# Patient Record
Sex: FEMALE | Race: WHITE | NOT HISPANIC OR LATINO | URBAN - METROPOLITAN AREA
[De-identification: names, ages, dates, MRNs, and addresses within clinical notes are randomized per-mention and may not be internally consistent; named-entity substitution may affect disease eponyms.]

---

## 2017-01-06 DIAGNOSIS — D09.9 ADENOCARCINOMA IN SITU: Primary | ICD-10-CM

## 2017-01-06 RX ORDER — PHENAZOPYRIDINE HYDROCHLORIDE 200 MG/1
200 TABLET, FILM COATED ORAL ONCE
Status: CANCELLED | OUTPATIENT
Start: 2017-01-06 | End: 2017-01-06

## 2017-01-12 ENCOUNTER — ANESTHESIA EVENT (OUTPATIENT)
Dept: SURGERY | Facility: AMBULATORY SURGERY CENTER | Age: 37
End: 2017-01-12

## 2017-01-12 ENCOUNTER — ANESTHESIA (OUTPATIENT)
Dept: SURGERY | Facility: AMBULATORY SURGERY CENTER | Age: 37
End: 2017-01-12

## 2017-01-12 ENCOUNTER — HOSPITAL ENCOUNTER (OUTPATIENT)
Facility: AMBULATORY SURGERY CENTER | Age: 37
End: 2017-01-12
Attending: OBSTETRICS & GYNECOLOGY

## 2017-01-12 VITALS
TEMPERATURE: 98.2 F | RESPIRATION RATE: 20 BRPM | OXYGEN SATURATION: 98 % | HEIGHT: 63 IN | WEIGHT: 133 LBS | BODY MASS INDEX: 23.57 KG/M2 | SYSTOLIC BLOOD PRESSURE: 163 MMHG | DIASTOLIC BLOOD PRESSURE: 104 MMHG

## 2017-01-12 DIAGNOSIS — D09.9 ADENOCARCINOMA IN SITU: Primary | ICD-10-CM

## 2017-01-12 DIAGNOSIS — D06.9 CIN III (CERVICAL INTRAEPITHELIAL NEOPLASIA GRADE III) WITH SEVERE DYSPLASIA: ICD-10-CM

## 2017-01-12 DIAGNOSIS — D06.9 ADENOCARCINOMA IN SITU (AIS) OF UTERINE CERVIX: ICD-10-CM

## 2017-01-12 LAB
HCG UR QL: NORMAL
INTERNAL QC OK POCT: YES

## 2017-01-12 RX ORDER — PHENAZOPYRIDINE HYDROCHLORIDE 100 MG/1
200 TABLET, FILM COATED ORAL ONCE
Status: DISCONTINUED | OUTPATIENT
Start: 2017-01-12 | End: 2017-01-12 | Stop reason: HOSPADM

## 2017-01-12 RX ORDER — HYDROCODONE BITARTRATE AND ACETAMINOPHEN 5; 325 MG/1; MG/1
1-2 TABLET ORAL
Status: DISCONTINUED | OUTPATIENT
Start: 2017-01-12 | End: 2017-01-13 | Stop reason: HOSPADM

## 2017-01-12 RX ORDER — KETAMINE HYDROCHLORIDE 10 MG/ML
INJECTION, SOLUTION INTRAMUSCULAR; INTRAVENOUS PRN
Status: DISCONTINUED | OUTPATIENT
Start: 2017-01-12 | End: 2017-01-12

## 2017-01-12 RX ORDER — SODIUM CHLORIDE, SODIUM LACTATE, POTASSIUM CHLORIDE, CALCIUM CHLORIDE 600; 310; 30; 20 MG/100ML; MG/100ML; MG/100ML; MG/100ML
INJECTION, SOLUTION INTRAVENOUS CONTINUOUS
Status: DISCONTINUED | OUTPATIENT
Start: 2017-01-12 | End: 2017-01-12 | Stop reason: HOSPADM

## 2017-01-12 RX ORDER — ACETAMINOPHEN 500 MG
1000 TABLET ORAL ONCE
Status: COMPLETED | OUTPATIENT
Start: 2017-01-12 | End: 2017-01-12

## 2017-01-12 RX ORDER — MAGNESIUM HYDROXIDE 1200 MG/15ML
LIQUID ORAL PRN
Status: DISCONTINUED | OUTPATIENT
Start: 2017-01-12 | End: 2017-01-12 | Stop reason: HOSPADM

## 2017-01-12 RX ORDER — MEPERIDINE HYDROCHLORIDE 25 MG/ML
12.5 INJECTION INTRAMUSCULAR; INTRAVENOUS; SUBCUTANEOUS
Status: DISCONTINUED | OUTPATIENT
Start: 2017-01-12 | End: 2017-01-13 | Stop reason: HOSPADM

## 2017-01-12 RX ORDER — BUPIVACAINE HYDROCHLORIDE 5 MG/ML
INJECTION, SOLUTION EPIDURAL; INTRACAUDAL PRN
Status: DISCONTINUED | OUTPATIENT
Start: 2017-01-12 | End: 2017-01-12 | Stop reason: HOSPADM

## 2017-01-12 RX ORDER — PROPOFOL 10 MG/ML
INJECTION, EMULSION INTRAVENOUS CONTINUOUS PRN
Status: DISCONTINUED | OUTPATIENT
Start: 2017-01-12 | End: 2017-01-12

## 2017-01-12 RX ORDER — FENTANYL CITRATE 50 UG/ML
25-50 INJECTION, SOLUTION INTRAMUSCULAR; INTRAVENOUS
Status: DISCONTINUED | OUTPATIENT
Start: 2017-01-12 | End: 2017-01-13 | Stop reason: HOSPADM

## 2017-01-12 RX ORDER — ONDANSETRON 4 MG/1
4 TABLET, ORALLY DISINTEGRATING ORAL EVERY 30 MIN PRN
Status: DISCONTINUED | OUTPATIENT
Start: 2017-01-12 | End: 2017-01-13 | Stop reason: HOSPADM

## 2017-01-12 RX ORDER — LIDOCAINE HYDROCHLORIDE 20 MG/ML
INJECTION, SOLUTION INFILTRATION; PERINEURAL PRN
Status: DISCONTINUED | OUTPATIENT
Start: 2017-01-12 | End: 2017-01-12

## 2017-01-12 RX ORDER — IODINE SOLUTION STRONG 5% (LUGOL'S) 5 %
SOLUTION ORAL PRN
Status: DISCONTINUED | OUTPATIENT
Start: 2017-01-12 | End: 2017-01-12 | Stop reason: HOSPADM

## 2017-01-12 RX ORDER — DEXAMETHASONE SODIUM PHOSPHATE 4 MG/ML
INJECTION, SOLUTION INTRA-ARTICULAR; INTRALESIONAL; INTRAMUSCULAR; INTRAVENOUS; SOFT TISSUE PRN
Status: DISCONTINUED | OUTPATIENT
Start: 2017-01-12 | End: 2017-01-12

## 2017-01-12 RX ORDER — PROPOFOL 10 MG/ML
INJECTION, EMULSION INTRAVENOUS PRN
Status: DISCONTINUED | OUTPATIENT
Start: 2017-01-12 | End: 2017-01-12

## 2017-01-12 RX ORDER — HYDROCODONE BITARTRATE AND ACETAMINOPHEN 5; 325 MG/1; MG/1
1 TABLET ORAL EVERY 6 HOURS PRN
Status: ON HOLD | COMMUNITY
End: 2017-01-27

## 2017-01-12 RX ORDER — SODIUM CHLORIDE, SODIUM LACTATE, POTASSIUM CHLORIDE, CALCIUM CHLORIDE 600; 310; 30; 20 MG/100ML; MG/100ML; MG/100ML; MG/100ML
INJECTION, SOLUTION INTRAVENOUS CONTINUOUS
Status: DISCONTINUED | OUTPATIENT
Start: 2017-01-12 | End: 2017-01-13 | Stop reason: HOSPADM

## 2017-01-12 RX ORDER — NALOXONE HYDROCHLORIDE 0.4 MG/ML
.1-.4 INJECTION, SOLUTION INTRAMUSCULAR; INTRAVENOUS; SUBCUTANEOUS
Status: DISCONTINUED | OUTPATIENT
Start: 2017-01-12 | End: 2017-01-13 | Stop reason: HOSPADM

## 2017-01-12 RX ORDER — IBUPROFEN 200 MG
600 TABLET ORAL
Status: DISCONTINUED | OUTPATIENT
Start: 2017-01-12 | End: 2017-01-13 | Stop reason: HOSPADM

## 2017-01-12 RX ORDER — ONDANSETRON 2 MG/ML
INJECTION INTRAMUSCULAR; INTRAVENOUS PRN
Status: DISCONTINUED | OUTPATIENT
Start: 2017-01-12 | End: 2017-01-12

## 2017-01-12 RX ORDER — FENTANYL CITRATE 50 UG/ML
25-50 INJECTION, SOLUTION INTRAMUSCULAR; INTRAVENOUS
Status: DISCONTINUED | OUTPATIENT
Start: 2017-01-12 | End: 2017-01-12 | Stop reason: HOSPADM

## 2017-01-12 RX ORDER — ONDANSETRON 2 MG/ML
4 INJECTION INTRAMUSCULAR; INTRAVENOUS EVERY 30 MIN PRN
Status: DISCONTINUED | OUTPATIENT
Start: 2017-01-12 | End: 2017-01-13 | Stop reason: HOSPADM

## 2017-01-12 RX ADMIN — Medication 1000 MG: at 08:00

## 2017-01-12 RX ADMIN — KETAMINE HYDROCHLORIDE 15 MG: 10 INJECTION, SOLUTION INTRAMUSCULAR; INTRAVENOUS at 08:37

## 2017-01-12 RX ADMIN — PROPOFOL 40 MG: 10 INJECTION, EMULSION INTRAVENOUS at 08:45

## 2017-01-12 RX ADMIN — PROPOFOL 50 MG: 10 INJECTION, EMULSION INTRAVENOUS at 08:50

## 2017-01-12 RX ADMIN — PROPOFOL 60 MG: 10 INJECTION, EMULSION INTRAVENOUS at 08:34

## 2017-01-12 RX ADMIN — PROPOFOL 200 MCG/KG/MIN: 10 INJECTION, EMULSION INTRAVENOUS at 08:34

## 2017-01-12 RX ADMIN — PROPOFOL 100 MG: 10 INJECTION, EMULSION INTRAVENOUS at 08:56

## 2017-01-12 RX ADMIN — SODIUM CHLORIDE, SODIUM LACTATE, POTASSIUM CHLORIDE, CALCIUM CHLORIDE: 600; 310; 30; 20 INJECTION, SOLUTION INTRAVENOUS at 08:00

## 2017-01-12 RX ADMIN — KETAMINE HYDROCHLORIDE 20 MG: 10 INJECTION, SOLUTION INTRAMUSCULAR; INTRAVENOUS at 08:48

## 2017-01-12 RX ADMIN — PROPOFOL 50 MG: 10 INJECTION, EMULSION INTRAVENOUS at 08:53

## 2017-01-12 RX ADMIN — ONDANSETRON 4 MG: 2 INJECTION INTRAMUSCULAR; INTRAVENOUS at 08:32

## 2017-01-12 RX ADMIN — LIDOCAINE HYDROCHLORIDE 60 MG: 20 INJECTION, SOLUTION INFILTRATION; PERINEURAL at 08:34

## 2017-01-12 RX ADMIN — DEXAMETHASONE SODIUM PHOSPHATE 4 MG: 4 INJECTION, SOLUTION INTRA-ARTICULAR; INTRALESIONAL; INTRAMUSCULAR; INTRAVENOUS; SOFT TISSUE at 08:32

## 2017-01-12 ASSESSMENT — LIFESTYLE VARIABLES: TOBACCO_USE: 1

## 2017-01-12 NOTE — BRIEF OP NOTE
[facility]    Brief Operative Note    Pre-operative diagnosis: DAYAN III, Adenocarcinoma in Situ  Post-operative diagnosis Same, s/p procedure below  Procedure: Procedure(s):  Exam Under Anesthesia, Loop Electrosurgical Excision Procedure (LEEP) - Wound Class: II-Clean Contaminated     Surgeon(s) and Role:  Alex Rose MD - Primary  Keila Louis MD PGY1- Assistant     Anesthesia: Monitor Anesthesia Care converted to LMA   Estimated blood loss: Less than 15 ml  UOP: Voided prior to OR  IVF: 350mL     Specimens: Anterior and posterior ectocervix   Findings:  Normal external genitalia, pink vaginal mucosa, uterus anteverted, cervix parous with rough surface. Previously biopsied sites visualized. Closed cervical os.  .    Complications: None apparent.    Keila Louis  01/12/2017  9:16 AM

## 2017-01-12 NOTE — OP NOTE
Operative Note    Pre-operative diagnosis:        DAYAN III, Adenocarcinoma in Situ  Post-operative diagnosis        Same, s/p procedure below  Procedure:      Procedure(s):  Exam Under Anesthesia Colposcopy, Loop Electrosurgical Excision Procedure (LEEP) - Wound Class: II-Clean Contaminated     Surgeon(s) and Role:  Alex Rose MD - Primary  Keila Louis MD PGY1- Assistant     Anesthesia:     Monitor Anesthesia Care          Estimated blood loss:  UOP: Voided prior OR   IVF: 350mL LR    Specimens: Anterior and posterior ectocervix   Findings: Normal external genitalia, pink vaginal mucosa, uterus anteverted, cervix parous with rough surface. Previously biopsied sites visualized. Closed cervical os.       Indications: Kellee Haile is a 36 year old  who has a history of normal pap, + HPV in 2012 and 2014 followed by LSIL and +HPV in 2016. A subsequent colposcopy showed high grade squamous dysplasia DAYAN III identified with koilocytotic atypia consistent with HPV infection, and adenocarcinoma-in situ. Discussed diagnostic and therapeutic management with LEEP cone biopsy followed by eventual hysterectomy. Patient agreed to LEEP cone biopsy. Risks, benefits of treatment, and no treatment were discussed. Patient's questions were elicited and answered.  and she consented to the procedure.     Procedure: Exam Under Anesthesia, Loop Electrosurgical Excision Procedure (LEEP)   Settings:   Cut 75      Coag 75       Blend 60/40       LOOP size/type: 90x45ic    Anesthesia: Monitor Anesthesia Care converted to LMA , local bupivicaine.   Prep: Lugol's solution    Description:  The patient was taken to the operating room where she initially underwent MAC anesthesia however had multiple episodes of coughing and underwent general anesthesia with LMA without difficulty. She was placed in the dorsal lithotomy position. She was prepped and draped in the usual sterile fashion. A timeout was performed. A sterile speculum was  inserted into the vagina. The vaginal canal and cervix was rinsed. A intracervical block was performed by injecting a total of 10cc bupivicaine at the 9 o'clock position into the length of the cervix. This was repeated at the 3 o'clock position.   The loop electro cautery device was passed on the anterior and posterior aspects of the cervix, and specimen was collected. Bleeding was controlled with pressure, ball-tip cautery, and Monsel's solution. There was minimal bleeding noted and the tenaculum removed with good hemostasis noted. The patient tolerated the procedure well and was taken to the recovery area in stable condition.     Complications: None apparent.    Keila Louis  01/12/2017  9:27 AM

## 2017-01-12 NOTE — IP AVS SNAPSHOT
MRN:[id number]                      After Visit Summary   1/12/2017    Kellee Haile    MRN: [id number]           Thank you!     Thank you for choosing [facility] for your care. Our goal is always to provide you with excellent care. Hearing back from our patients is one way we can continue to improve our services. Please take a few minutes to complete the written survey that you may receive in the mail after you visit with us. Thank you!        Patient Information     Date Of Birth          1980        About your hospital stay     You were admitted on:  January 12, 2017 [facility]    You were discharged on:  January 12, 2017       Who to Call     For medical emergencies, please call 911.  For non-urgent questions about your medical care, please call your primary care provider or clinic, [phone]  For questions related to your surgery, please call your surgery clinic        Attending Provider     Provider    Alex Rose MD       Primary Care Provider Office Phone # Fax #    Surinder Collins [phone]       [facility] [address] [address]        After Care Instructions     Discharge Instructions       Resume pre procedure diet            Discharge Instructions       Pelvic Rest. No tampons, douching or intercourse for  4  weeks.            Discharge Instructions       Patient may return to work POD  1            Discharge Instructions       Patient may drive beginning POD  1            Discharge Instructions       Follow-up at your appointment on 2/22/17            Discharge Instructions       Please call your provider if you have fevers greater than 100.4, foul smelling vaginal discharge, or you have vaginal bleeding that is soaking through greater than 1 pad per hour.            No Aspirin, Ibuprofen or Naproxen       Take 600 mg over-the-counter Ibuprofen by mouth every 6 hours as needed for  pain            No alcohol       NO ALCOHOL for 24 hours post procedure            No driving or operating machinery       No driving or operating machinery until day after procedure            Shower        Shower on Post-op day  1.   DO NOT take a bath                  Your next 10 appointments already scheduled     Jan 27, 2017   Procedure with Alex Rose MD   [facility], [place], Same Day Surgery (--)    [address] [facility] [address]   [phone]            Feb 22, 2017  9:30 AM   (Arrive by 9:15 AM)   Post-Op with Alex Rose MD   [facility] ([facility])    [address]  2nd Floor  [facility] [id number] [phone]              Further instructions from your care team       [facility]  Home Care Following Anesthesia  For 24 hours after surgery:  1. Get plenty of rest.  A responsible adult must stay with you for at least 24 hours after you leave the surgery center.  2. Do not drive or use heavy equipment.  If you have weakness or tingling, don't drive or use heavy equipment until this feeling goes away.   3. Do not drink alcohol.   4. Avoid strenuous or risky activities.  Ask for help when climbing stairs.  5. You may feel lightheaded.  IF so, sit for a few minutes before standing.  Have someone help you get up.   6. If you have nausea (feel sick to your stomach): Drink only clear liquids such as apple juice, ginger ale, broth or 7-Up.  Rest may also help.  Be sure to drink enough fluids.  Move to a regular diet as you feel able.   7. You may have a slight fever.  Call the doctor if your fever is over 100 F (37.7 C) (taken under the tongue) or lasts longer than 24 hours.  8. You may have a dry mouth, a sore throat, muscle aches or trouble sleeping. These should go away after 24 hours.  9. Do not make important or legal decisions.   If you are female and have had general anesthesia you may have received a  "medication that inhibits the effectiveness of oral contraceptives.  We suggest you use a backup method of contraception for the next 7 days if this applies to you.  Tips for taking pain medications  To get the best pain relief possible, remember these points:    Take pain medications as directed, before pain becomes severe.    Pain medication can upset your stomach: taking it with food may help.    Constipation is a common side effect of pain medication. Drink plenty of  fluids.    Eat foods high in fiber. Take a stool softener if recommended by your doctor or pharmacist.    Do not drink alcohol, drive or operate machinery while taking pain medications.    Ask about other ways to control pain, such as with heat, ice or relaxation.    Call a doctor for any of the followin. Signs of infection (fever, growing tenderness at the surgery site, a large amount of drainage or bleeding, severe pain, foul-smelling drainage, redness, swelling).  2. It has been over 8 to 10 hours since surgery and you are still not able to urinate (pass water).  3. Headache for over 24 hours.      Your doctor is:  Dr. Alex Rose, Gynecologic Oncology: [phone]                                    Or dial [phone] and ask for the resident on call for:  Gynecologic Oncology  For emergency care, call the:  [facility]:  [phone] (TTY for hearing impaired: [phone])                      Pending Results     No orders found from 2017 to 2017.            Admission Information        Provider Department Dept Phone    2017 Alex Rose MD  Main [place] [phone]      Your Vitals Were     Blood Pressure Temperature Height    141/94 mmHg 98.7  F (37.1  C) (Oral) 1.6 m (5' 3\")    Weight BMI (Body Mass Index) Pulse Oximetry    60.328 kg (133 lb) 23.57 kg/m2 98%      MyChart Information     [employer] gives you secure access to your electronic health record. If you see a primary care provider, you can also " send messages to your care team and make appointments. If you have questions, please call your primary care clinic.  If you do not have a primary care provider, please call [phone] and they will assist you.      [employer] is an electronic gateway that provides easy, online access to your medical records. With [employer], you can request a clinic appointment, read your test results, renew a prescription or communicate with your care team.     To access your existing account, please contact your [facility] or call [phone] for assistance.        Care EveryWhere ID     This is your Care EveryWhere ID. This could be used by other organizations to access your [facility] medical records  [id number]           Review of your medicines      CONTINUE these medicines which have NOT CHANGED        Dose / Directions    HYDROcodone-acetaminophen 5-325 MG per tablet   Commonly known as:  NORCO        Dose:  1 tablet   Take 1 tablet by mouth every 6 hours as needed for moderate to severe pain   Refills:  0       norgestrel-ethinyl estradiol 0.3-30 MG-MCG per tablet   Commonly known as:  LO/OVRAL        Dose:  1 tablet   Take 1 tablet by mouth daily   Refills:  0                Protect others around you: Learn how to safely use, store and throw away your medicines at www.disposemymeds.org.             Medication List: This is a list of all your medications and when to take them. Check marks below indicate your daily home schedule. Keep this list as a reference.      Medications           Morning Afternoon Evening Bedtime As Needed    HYDROcodone-acetaminophen 5-325 MG per tablet   Commonly known as:  NORCO   Take 1 tablet by mouth every 6 hours as needed for moderate to severe pain                                norgestrel-ethinyl estradiol 0.3-30 MG-MCG per tablet   Commonly known as:  LO/OVRAL   Take 1 tablet by mouth daily

## 2017-01-12 NOTE — ANESTHESIA CARE TRANSFER NOTE
Patient: Kellee Haile    COMBINED COLPOSCOPY CERVIX, LOOP ELECTRODE BIOPSY (N/A Cervix)  Additional InformationProcedure(s):  Exam Under Anesthesia, Loop Electrosurgical Excision Procedure (LEEP) - Wound Class: II-Clean Contaminated    Diagnosis: Adenocarcinoma in Situ  Diagnosis Additional Information: No value filed.    Anesthesia Type:   MAC     Note:  Airway :Nasal Cannula  Patient transferred to:PACU  Comments: Patient to PACU on 2L O2 via NC. Patient is awake and verbal. Report to RN and transfer of care. BP:131/100  HR: 99 SpO2: 99% on 2L O2. RR: 18 Temp: 36.5      Vitals: (Last set prior to Anesthesia Care Transfer)              Electronically Signed By: ANGELIQUE Padron CRNA  January 12, 2017  9:21 AM

## 2017-01-12 NOTE — OR NURSING
"Patient given instructions for discharge with Brother. Additional instruction given on the importance of maintaining healthy blood pressure control. This RN highly encourages patient to seek attention to get blood pressure regulated before surgery in two weeks, risks of uncontrolled blood pressure reviewed with patient, additional risks also discussed like smoking history for increased risk of CVA. Patient states that blood pressure has been elevated \"for a few months now\"  "

## 2017-01-12 NOTE — ANESTHESIA POSTPROCEDURE EVALUATION
Patient: Kellee Haile    COMBINED COLPOSCOPY CERVIX, LOOP ELECTRODE BIOPSY (N/A Cervix)  Additional InformationProcedure(s):  Exam Under Anesthesia, Loop Electrosurgical Excision Procedure (LEEP) - Wound Class: II-Clean Contaminated    Diagnosis:Adenocarcinoma in Situ  Diagnosis Additional Information: No value filed.    Anesthesia Type:  MAC    Note:  Anesthesia Post Evaluation    Patient location during evaluation: PACU and Bedside  Patient participation: Able to fully participate in evaluation  Level of consciousness: awake and alert  Pain management: adequate  Airway patency: patent  Cardiovascular status: acceptable  Respiratory status: acceptable  Hydration status: acceptable  PONV: none     Anesthetic complications: None          Last vitals:  Filed Vitals:    01/12/17 0936 01/12/17 0940 01/12/17 1005   BP: 153/97 152/101 163/104   Temp: 36.8  C (98.2  F) 36.8  C (98.2  F) 36.8  C (98.2  F)   Resp:  18 20   SpO2:  100% 98%       Electronically Signed By: Melania Smyth MD  January 12, 2017  10:40 AM

## 2017-01-12 NOTE — ANESTHESIA PREPROCEDURE EVALUATION
Anesthesia Evaluation     . Pt has had prior anesthetic. Type: General      ROS/MED HX    ENT/Pulmonary:     (+)tobacco use, Current use 1ppd x 25 yrs packs/day  , . .    Neurologic:  - neg neurologic ROS     Cardiovascular:  - neg cardiovascular ROS       METS/Exercise Tolerance:  >4 METS   Hematologic:  - neg hematologic  ROS       Musculoskeletal:  - neg musculoskeletal ROS       GI/Hepatic:  - neg GI/hepatic ROS       Renal/Genitourinary:     (+) Other Renal/ Genitourinary, Cervix/abnormal PAP smear, endometriosis      Endo:  - neg endo ROS       Psychiatric:  - neg psychiatric ROS       Infectious Disease:  - neg infectious disease ROS       Malignancy:      - no malignancy   Other:    (+) No chance of pregnancy   - neg other ROS           Physical Exam  Normal systems: cardiovascular, pulmonary and dental    Airway   Mallampati: I  TM distance: >3 FB  Neck ROM: full    Dental     Cardiovascular   Rhythm and rate: regular and normal      Pulmonary    breath sounds clear to auscultation                    Anesthesia Plan      History & Physical Review  History and physical reviewed and following examination; no interval change.    ASA Status:  1 .    NPO Status:  > 8 hours    Plan for MAC with Propofol induction. Maintenance will be Other.  Reason for MAC:  Deep or markedly invasive procedure (G8)  PONV prophylaxis:  Ondansetron (or other 5HT-3)       Postoperative Care  Postoperative pain management:  IV analgesics and Oral pain medications.      Consents  Anesthetic plan, risks, benefits and alternatives discussed with:  Patient..                          .

## 2017-01-12 NOTE — IP AVS SNAPSHOT
[facility]    [address] [place] [id number]    Phone:  [phone]    Fax:  [phone]                                       After Visit Summary   1/12/2017    Kellee Haile    MRN: [id number]           After Visit Summary Signature Page     I have received my discharge instructions, and my questions have been answered. I have discussed any challenges I see with this plan with the nurse or doctor.    ..........................................................................................................................................  Patient/Patient Representative Signature      ..........................................................................................................................................  Patient Representative Print Name and Relationship to Patient    ..................................................               ................................................  Date                                            Time    ..........................................................................................................................................  Reviewed by Signature/Title    ...................................................              ..............................................  Date                                                            Time

## 2017-01-12 NOTE — DISCHARGE INSTRUCTIONS
[facility]  Home Care Following Anesthesia  For 24 hours after surgery:  1. Get plenty of rest.  A responsible adult must stay with you for at least 24 hours after you leave the surgery center.  2. Do not drive or use heavy equipment.  If you have weakness or tingling, don't drive or use heavy equipment until this feeling goes away.   3. Do not drink alcohol.   4. Avoid strenuous or risky activities.  Ask for help when climbing stairs.  5. You may feel lightheaded.  IF so, sit for a few minutes before standing.  Have someone help you get up.   6. If you have nausea (feel sick to your stomach): Drink only clear liquids such as apple juice, ginger ale, broth or 7-Up.  Rest may also help.  Be sure to drink enough fluids.  Move to a regular diet as you feel able.   7. You may have a slight fever.  Call the doctor if your fever is over 100 F (37.7 C) (taken under the tongue) or lasts longer than 24 hours.  8. You may have a dry mouth, a sore throat, muscle aches or trouble sleeping. These should go away after 24 hours.  9. Do not make important or legal decisions.   If you are female and have had general anesthesia you may have received a medication that inhibits the effectiveness of oral contraceptives.  We suggest you use a backup method of contraception for the next 7 days if this applies to you.  Tips for taking pain medications  To get the best pain relief possible, remember these points:    Take pain medications as directed, before pain becomes severe.    Pain medication can upset your stomach: taking it with food may help.    Constipation is a common side effect of pain medication. Drink plenty of  fluids.    Eat foods high in fiber. Take a stool softener if recommended by your doctor or pharmacist.    Do not drink alcohol, drive or operate machinery while taking pain medications.    Ask about other ways to control pain, such as with heat, ice or relaxation.    Call a doctor  for any of the followin. Signs of infection (fever, growing tenderness at the surgery site, a large amount of drainage or bleeding, severe pain, foul-smelling drainage, redness, swelling).  2. It has been over 8 to 10 hours since surgery and you are still not able to urinate (pass water).  3. Headache for over 24 hours.      Your doctor is:  Dr. Alex Rose, Gynecologic Oncology: [phone]                                    Or dial [phone] and ask for the resident on call for:  Gynecologic Oncology  For emergency care, call the:  [facility]:  [phone] (TTY for hearing impaired: [phone])

## 2017-01-13 LAB — COPATH REPORT: NORMAL

## 2017-01-16 ENCOUNTER — TRANSFERRED RECORDS (OUTPATIENT)
Dept: HEALTH INFORMATION MANAGEMENT | Facility: CLINIC | Age: 37
End: 2017-01-16

## 2017-01-27 ENCOUNTER — ANESTHESIA (OUTPATIENT)
Dept: SURGERY | Facility: CLINIC | Age: 37
End: 2017-01-27
Payer: COMMERCIAL

## 2017-01-27 ENCOUNTER — SURGERY (OUTPATIENT)
Age: 37
End: 2017-01-27
Payer: COMMERCIAL

## 2017-01-27 PROCEDURE — 25000308 HC RX OP HPI UCR WEL MED 250 IP 250: Performed by: STUDENT IN AN ORGANIZED HEALTH CARE EDUCATION/TRAINING PROGRAM

## 2017-01-27 PROCEDURE — 25000125 ZZHC RX 250: Performed by: OBSTETRICS & GYNECOLOGY

## 2017-01-27 PROCEDURE — 25000125 ZZHC RX 250: Performed by: ANESTHESIOLOGY

## 2017-01-27 PROCEDURE — C9290 INJ, BUPIVACAINE LIPOSOME: HCPCS | Performed by: ANESTHESIOLOGY

## 2017-01-27 PROCEDURE — 25000125 ZZHC RX 250: Performed by: STUDENT IN AN ORGANIZED HEALTH CARE EDUCATION/TRAINING PROGRAM

## 2017-01-27 PROCEDURE — 58571 TLH W/T/O 250 G OR LESS: CPT | Mod: GC | Performed by: OBSTETRICS & GYNECOLOGY

## 2017-01-27 PROCEDURE — 25000128 H RX IP 250 OP 636: Performed by: ANESTHESIOLOGY

## 2017-01-27 PROCEDURE — 25000128 H RX IP 250 OP 636: Performed by: STUDENT IN AN ORGANIZED HEALTH CARE EDUCATION/TRAINING PROGRAM

## 2017-01-27 PROCEDURE — 25800025 ZZH RX 258: Performed by: STUDENT IN AN ORGANIZED HEALTH CARE EDUCATION/TRAINING PROGRAM

## 2017-01-27 RX ORDER — GLYCOPYRROLATE 0.2 MG/ML
INJECTION, SOLUTION INTRAMUSCULAR; INTRAVENOUS PRN
Status: DISCONTINUED | OUTPATIENT
Start: 2017-01-27 | End: 2017-01-27

## 2017-01-27 RX ORDER — DEXAMETHASONE SODIUM PHOSPHATE 4 MG/ML
INJECTION, SOLUTION INTRA-ARTICULAR; INTRALESIONAL; INTRAMUSCULAR; INTRAVENOUS; SOFT TISSUE PRN
Status: DISCONTINUED | OUTPATIENT
Start: 2017-01-27 | End: 2017-01-27

## 2017-01-27 RX ORDER — FENTANYL CITRATE 50 UG/ML
INJECTION, SOLUTION INTRAMUSCULAR; INTRAVENOUS PRN
Status: DISCONTINUED | OUTPATIENT
Start: 2017-01-27 | End: 2017-01-27

## 2017-01-27 RX ORDER — LIDOCAINE HYDROCHLORIDE 20 MG/ML
INJECTION, SOLUTION INFILTRATION; PERINEURAL PRN
Status: DISCONTINUED | OUTPATIENT
Start: 2017-01-27 | End: 2017-01-27

## 2017-01-27 RX ORDER — SODIUM CHLORIDE, SODIUM LACTATE, POTASSIUM CHLORIDE, CALCIUM CHLORIDE 600; 310; 30; 20 MG/100ML; MG/100ML; MG/100ML; MG/100ML
INJECTION, SOLUTION INTRAVENOUS CONTINUOUS PRN
Status: DISCONTINUED | OUTPATIENT
Start: 2017-01-27 | End: 2017-01-27

## 2017-01-27 RX ORDER — LIDOCAINE HYDROCHLORIDE 40 MG/ML
INJECTION, SOLUTION RETROBULBAR PRN
Status: DISCONTINUED | OUTPATIENT
Start: 2017-01-27 | End: 2017-01-27

## 2017-01-27 RX ORDER — BUPIVACAINE HYDROCHLORIDE AND EPINEPHRINE 2.5; 5 MG/ML; UG/ML
INJECTION, SOLUTION INFILTRATION; PERINEURAL PRN
Status: DISCONTINUED | OUTPATIENT
Start: 2017-01-27 | End: 2017-01-27

## 2017-01-27 RX ORDER — PROPOFOL 10 MG/ML
INJECTION, EMULSION INTRAVENOUS PRN
Status: DISCONTINUED | OUTPATIENT
Start: 2017-01-27 | End: 2017-01-27

## 2017-01-27 RX ORDER — ONDANSETRON 2 MG/ML
INJECTION INTRAMUSCULAR; INTRAVENOUS PRN
Status: DISCONTINUED | OUTPATIENT
Start: 2017-01-27 | End: 2017-01-27

## 2017-01-27 RX ORDER — NEOSTIGMINE METHYLSULFATE 1 MG/ML
VIAL (ML) INJECTION PRN
Status: DISCONTINUED | OUTPATIENT
Start: 2017-01-27 | End: 2017-01-27

## 2017-01-27 RX ORDER — CEFAZOLIN SODIUM 1 G/3ML
INJECTION, POWDER, FOR SOLUTION INTRAMUSCULAR; INTRAVENOUS PRN
Status: DISCONTINUED | OUTPATIENT
Start: 2017-01-27 | End: 2017-01-27

## 2017-01-27 RX ADMIN — ROCURONIUM BROMIDE 5 MG: 10 INJECTION INTRAVENOUS at 09:54

## 2017-01-27 RX ADMIN — BUPIVACAINE HYDROCHLORIDE AND EPINEPHRINE BITARTRATE 20 ML: 2.5; .005 INJECTION, SOLUTION INFILTRATION; PERINEURAL at 07:19

## 2017-01-27 RX ADMIN — FENTANYL CITRATE 25 MCG: 50 INJECTION, SOLUTION INTRAMUSCULAR; INTRAVENOUS at 10:35

## 2017-01-27 RX ADMIN — LIDOCAINE HYDROCHLORIDE 100 MG: 20 INJECTION, SOLUTION INFILTRATION; PERINEURAL at 07:51

## 2017-01-27 RX ADMIN — PROPOFOL 30 MG: 10 INJECTION, EMULSION INTRAVENOUS at 07:58

## 2017-01-27 RX ADMIN — FENTANYL CITRATE 25 MCG: 50 INJECTION, SOLUTION INTRAMUSCULAR; INTRAVENOUS at 09:28

## 2017-01-27 RX ADMIN — BUPIVACAINE 20 ML: 13.3 INJECTION, SUSPENSION, LIPOSOMAL INFILTRATION at 07:19

## 2017-01-27 RX ADMIN — LIDOCAINE HYDROCHLORIDE 4 ML: 40 INJECTION, SOLUTION RETROBULBAR; TOPICAL at 07:54

## 2017-01-27 RX ADMIN — Medication 3.5 MG: at 10:16

## 2017-01-27 RX ADMIN — GLYCOPYRROLATE 0.5 MG: 0.2 INJECTION, SOLUTION INTRAMUSCULAR; INTRAVENOUS at 10:16

## 2017-01-27 RX ADMIN — FENTANYL CITRATE 25 MCG: 50 INJECTION, SOLUTION INTRAMUSCULAR; INTRAVENOUS at 08:29

## 2017-01-27 RX ADMIN — ONDANSETRON 6 MG: 2 INJECTION INTRAMUSCULAR; INTRAVENOUS at 10:08

## 2017-01-27 RX ADMIN — DEXAMETHASONE SODIUM PHOSPHATE 4 MG: 4 INJECTION, SOLUTION INTRAMUSCULAR; INTRAVENOUS at 08:20

## 2017-01-27 RX ADMIN — FENTANYL CITRATE 100 MCG: 50 INJECTION, SOLUTION INTRAMUSCULAR; INTRAVENOUS at 07:44

## 2017-01-27 RX ADMIN — CEFAZOLIN 1 G: 1 INJECTION, POWDER, FOR SOLUTION INTRAMUSCULAR; INTRAVENOUS at 09:56

## 2017-01-27 RX ADMIN — FENTANYL CITRATE 25 MCG: 50 INJECTION, SOLUTION INTRAMUSCULAR; INTRAVENOUS at 10:04

## 2017-01-27 RX ADMIN — PROPOFOL 120 MG: 10 INJECTION, EMULSION INTRAVENOUS at 07:51

## 2017-01-27 RX ADMIN — ROCURONIUM BROMIDE 10 MG: 10 INJECTION INTRAVENOUS at 09:27

## 2017-01-27 RX ADMIN — FENTANYL CITRATE 50 MCG: 50 INJECTION, SOLUTION INTRAMUSCULAR; INTRAVENOUS at 07:56

## 2017-01-27 RX ADMIN — ROCURONIUM BROMIDE 20 MG: 10 INJECTION INTRAVENOUS at 08:18

## 2017-01-27 RX ADMIN — ROCURONIUM BROMIDE 40 MG: 10 INJECTION INTRAVENOUS at 07:51

## 2017-01-27 RX ADMIN — CEFAZOLIN SODIUM 2 G: 2 INJECTION, SOLUTION INTRAVENOUS at 08:05

## 2017-01-27 RX ADMIN — ROCURONIUM BROMIDE 20 MG: 10 INJECTION INTRAVENOUS at 08:50

## 2017-01-27 RX ADMIN — FENTANYL CITRATE 25 MCG: 50 INJECTION, SOLUTION INTRAMUSCULAR; INTRAVENOUS at 09:57

## 2017-01-27 RX ADMIN — FENTANYL CITRATE 25 MCG: 50 INJECTION, SOLUTION INTRAMUSCULAR; INTRAVENOUS at 10:18

## 2017-01-27 RX ADMIN — SODIUM CHLORIDE, POTASSIUM CHLORIDE, SODIUM LACTATE AND CALCIUM CHLORIDE: 600; 310; 30; 20 INJECTION, SOLUTION INTRAVENOUS at 06:55

## 2017-01-31 ENCOUNTER — TELEPHONE (OUTPATIENT)
Dept: ONCOLOGY | Facility: CLINIC | Age: 37
End: 2017-01-31

## 2017-01-31 ENCOUNTER — CARE COORDINATION (OUTPATIENT)
Dept: ONCOLOGY | Facility: CLINIC | Age: 37
End: 2017-01-31

## 2017-01-31 NOTE — TELEPHONE ENCOUNTER
----- Message from Lauren Schoen, RN sent at 1/31/2017  9:47 AM CST -----  Regarding: [place] Refill   Hi Kellee Zeng called in today requesting a refill of her Norco. She has been taking 2 tabs every 6-7 hours with relief. She currently only has 1 tab left. Would you be able to further look into getting this refilled for her?     Thanks!     TIERNEY Granado  [facility]

## 2017-01-31 NOTE — TELEPHONE ENCOUNTER
----- Message from Lauren Schoen, RN sent at 1/31/2017  9:47 AM CST -----  Regarding: [place] Refill   Hi Kellee Zeng called in today requesting a refill of her Norco. She has been taking 2 tabs every 6-7 hours with relief. She currently only has 1 tab left. Would you be able to further look into getting this refilled for her?     Thanks!     TIERNEY Granado  [facility]

## 2017-02-01 ENCOUNTER — TELEPHONE (OUTPATIENT)
Dept: ONCOLOGY | Facility: CLINIC | Age: 37
End: 2017-02-01

## 2017-02-01 NOTE — TELEPHONE ENCOUNTER
[facility] called to inform us that Kellee received a script for [place] from their facility yesterday, so that she did not need to drive here. 20 Tablets.    Corine Mendoza RN

## 2017-02-22 ENCOUNTER — OFFICE VISIT (OUTPATIENT)
Dept: ONCOLOGY | Facility: CLINIC | Age: 37
End: 2017-02-22
Attending: OBSTETRICS & GYNECOLOGY
Payer: COMMERCIAL

## 2017-02-22 VITALS
HEART RATE: 91 BPM | TEMPERATURE: 98.3 F | OXYGEN SATURATION: 99 % | WEIGHT: 137.8 LBS | DIASTOLIC BLOOD PRESSURE: 95 MMHG | SYSTOLIC BLOOD PRESSURE: 149 MMHG | BODY MASS INDEX: 24.41 KG/M2 | HEIGHT: 63 IN

## 2017-02-22 DIAGNOSIS — D06.9 ADENOCARCINOMA IN SITU (AIS) OF UTERINE CERVIX: ICD-10-CM

## 2017-02-22 DIAGNOSIS — D06.9 CIN III (CERVICAL INTRAEPITHELIAL NEOPLASIA GRADE III) WITH SEVERE DYSPLASIA: Primary | ICD-10-CM

## 2017-02-22 PROCEDURE — 99212 OFFICE O/P EST SF 10 MIN: CPT | Mod: ZF

## 2017-02-22 PROCEDURE — 99024 POSTOP FOLLOW-UP VISIT: CPT | Mod: ZP | Performed by: OBSTETRICS & GYNECOLOGY

## 2017-02-22 ASSESSMENT — PAIN SCALES - GENERAL: PAINLEVEL: NO PAIN (0)

## 2017-02-22 NOTE — PROGRESS NOTES
Clinic Follow-up Visit  Date: 2/22/2017   Patient name: Kellee Haile presents for post-op visit after recent hysterectomy.  Her disease history is outlined as below:  2012 pap normal  2014 HPV+ on pap  6/2016 pap LSIL, HPV+  9/2016 colposcopy showed CINIII and AIS  12/2016: Referred by Dr. Collins from [facility] for the above   1/12/17: EUA, LEEP: Pathology came back as CIN2 with focus of endocervical AIS anteriorly, and CIN2 posteriorly  1/27/17: Robot-assisted TLH-BS due to the above results.  Pathology came back benign, specifically negative for residual cervical neoplasia or AIS  Today, she reports that she feels well; is back to work on lighter duty, and tolerating ambulation, voiding, regular diet, and regular BMs all without difficulty.  Still has occasional light bleeding but nothing heavy and no abnormal discharge or fever/chills.  Denies any problem with incisions, or any abdominal pain requiring more than occasional ibuprofen or tylenol.  She just found out this morning that her stepfather passed away and is appropriately sad about this.     Review of Systems - Oncology   10 point ROS neg except as noted in HPI:  Answers for HPI/ROS submitted by the patient on 2/19/2017   General Symptoms: No  Skin Symptoms: No  HENT Symptoms: No  EYE SYMPTOMS: No  HEART SYMPTOMS: No  LUNG SYMPTOMS: No  INTESTINAL SYMPTOMS: No  URINARY SYMPTOMS: No  GYNECOLOGIC SYMPTOMS: No  BREAST SYMPTOMS: No  SKELETAL SYMPTOMS: No  BLOOD SYMPTOMS: No  NERVOUS SYSTEM SYMPTOMS: No  MENTAL HEALTH SYMPTOMS: No      PMH:            Endometriosis            Ovarian cyst  OB history             Pregnancy #1 11/1998 weeks gestation:40  vaginal delivery with no complication, male infant, BW: 7 lbs 9oz             Pregnancy #2: 05/2007 miscarriage             Pregnancy #3: 09/2009 weeks gestation:39  vaginal delivery with no complication, female infant,  BW: 6 lbs 8oz  Gyn History:         Menarche: 9 years old         "No STDs, No history of ovarian, breast or endometrial cancer    Meds: Takes no medications typically    Allegies: NKDA  PSH:  Tympanostromy tubes  Endometrial scraping: - ,   (See HPI for recent GYN procedures)  FH:  Maternal Grandmother with diabetes.  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 Smokes 3 cigarettes/day for 22 years    PE:   Vitals:    17 0930   BP: (!) 149/95   BP Location: Right arm   Patient Position: Chair   Cuff Size: Adult Regular   Pulse: 91   Temp: 98.3  F (36.8  C)   TempSrc: Oral   SpO2: 99%   Weight: 62.5 kg (137 lb 12.8 oz)   Height: 1.6 m (5' 3\")      General Appearance: healthy and alert, no distress  Respiratory: normal diaphragmatic excursion  Skin: no lesions or rashes   Neurological: normal gait, no gross defects  Psychiatric: appropriate mood and affect           Genitourinary: deferred    Assesment: 36 year old  female , now about 4 weeks s/p RA-TLH-BS for cervical AIS, with no residual disease on pathology.    Plan  1. We discussed the role of ongoing surveillance for dysplasia of the lower genital tract due to her history of dysplasia/AIS.  Also discussed the relatively good prognosis of surgical treatment for Stage 0 disease of the cervix.  At this point recommend:   - Vaginal cytology & HPV testing q12 months   - Smoking cessation encouraged today; she has had some success with chantix in the past but doesn't feel ready/confident to quit right now.  Encouraged her to resume this discussion with her PCP as longitudinal care with support in quitting will be most likely to aid in success.      Pt seen and discussed with Dr. Milton Szymanski MD (PGY-4)  2017 9:23 AM      I evaluated and examined this patient and directed all aspects of her care as outlined in mine and the " resident's note above    Alex Rose Jr., M.D., M.S.  Professor, Gynecologic Oncology  Obstetrics, Gynecology and Women's Health   [employer]  Chief Medical Officer  [facility] and [facility]

## 2017-02-22 NOTE — MR AVS SNAPSHOT
"              After Visit Summary   2/22/2017    Kellee Haile    MRN: [id number]           Patient Information     Date Of Birth          1980        Visit Information        Provider Department      2/22/2017 9:30 AM Alex Rose MD [facility]        Today's Diagnoses     DAYAN III (cervical intraepithelial neoplasia grade III) with severe dysplasia    -  1    Adenocarcinoma in situ (AIS) of uterine cervix           Follow-ups after your visit        Who to contact     If you have questions or need follow up information about today's clinic visit or your schedule please contact [facility] directly at [phone].  Normal or non-critical lab and imaging results will be communicated to you by SuitMehart, letter or phone within 4 business days after the clinic has received the results. If you do not hear from us within 7 days, please contact the clinic through SuitMehart or phone. If you have a critical or abnormal lab result, we will notify you by phone as soon as possible.  Submit refill requests through Vestec or call your pharmacy and they will forward the refill request to us. Please allow 3 business days for your refill to be completed.          Additional Information About Your Visit        MyChart Information     Vestec gives you secure access to your electronic health record. If you see a primary care provider, you can also send messages to your care team and make appointments. If you have questions, please call your primary care clinic.  If you do not have a primary care provider, please call [phone] and they will assist you.        Care EveryWhere ID     This is your Care EveryWhere ID. This could be used by other organizations to access your [place] medical records  [id number]        Your Vitals Were     Pulse Temperature Height Pulse Oximetry BMI (Body Mass Index)       91 98.3  F (36.8  C) (Oral) 1.6 m (5' 3\") 99% 24.41 kg/m2        Blood Pressure from " Last 3 Encounters:   02/22/17 (!) 149/95   01/27/17 117/76   01/12/17 (!) 163/104    Weight from Last 3 Encounters:   02/22/17 62.5 kg (137 lb 12.8 oz)   01/27/17 62.8 kg (138 lb 7.2 oz)   01/12/17 60.3 kg (133 lb)              Today, you had the following     No orders found for display       Primary Care Provider Office Phone # Fax #    Surinder Collins [phone]       [facility] [address] [facility] [address]        Thank you!     Thank you for choosing [facility]  for your care. Our goal is always to provide you with excellent care. Hearing back from our patients is one way we can continue to improve our services. Please take a few minutes to complete the written survey that you may receive in the mail after your visit with us. Thank you!             Your Updated Medication List - Protect others around you: Learn how to safely use, store and throw away your medicines at www.disposemymeds.org.      Notice  As of 2/22/2017 11:59 PM    You have not been prescribed any medications.

## 2017-02-22 NOTE — NURSING NOTE
"Kellee Haile is a 36 year old female who presents for:  Chief Complaint   Patient presents with     Oncology Clinic Visit     Return patient visit- DAYAN II        Initial Vitals:  BP (!) 149/95 (BP Location: Right arm, Patient Position: Chair, Cuff Size: Adult Regular)  Pulse 91  Temp 98.3  F (36.8  C) (Oral)  Ht 1.6 m (5' 3\")  Wt 62.5 kg (137 lb 12.8 oz)  SpO2 99%  BMI 24.41 kg/m2 Estimated body mass index is 24.41 kg/(m^2) as calculated from the following:    Height as of this encounter: 1.6 m (5' 3\").    Weight as of this encounter: 62.5 kg (137 lb 12.8 oz).. Body surface area is 1.67 meters squared. BP completed using cuff size: regular  No Pain (0) No LMP recorded. Patient is not currently having periods (Reason: Birth Control). Allergies and medications reviewed.     Medications: Medication refills not needed today.  Pharmacy name entered into EPIC: Data Unavailable    Comments: Patient denied having any pelvic or vaginal pain at today's visit.      7 minutes for nursing intake (face to face time)   Aleksandra Mcgowan CMA        "

## 2017-02-22 NOTE — LETTER
2/22/2017       RE: Kellee Haile  [address] [facility] [address]     Dear Colleague,    Thank you for referring your patient, Kellee Haile, to the [facility]. Please see a copy of my visit note below.    Clinic Follow-up Visit  Date: 2/22/2017   Patient name: Kellee Haile presents for post-op visit after recent hysterectomy.  Her disease history is outlined as below:  2012 pap normal  2014 HPV+ on pap  6/2016 pap LSIL, HPV+  9/2016 colposcopy showed CINIII and AIS  12/2016: Referred by Dr. 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  Pathology came back benign, specifically negative for residual cervical neoplasia or AIS  Today, she reports that she feels well; is back to work on lighter duty, and tolerating ambulation, voiding, regular diet, and regular BMs all without difficulty.  Still has occasional light bleeding but nothing heavy and no abnormal discharge or fever/chills.  Denies any problem with incisions, or any abdominal pain requiring more than occasional ibuprofen or tylenol.  She just found out this morning that her stepfather passed away and is appropriately sad about this.     Review of Systems - Oncology   10 point ROS neg except as noted in HPI:  Answers for HPI/ROS submitted by the patient on 2/19/2017   General Symptoms: No  Skin Symptoms: No  HENT Symptoms: No  EYE SYMPTOMS: No  HEART SYMPTOMS: No  LUNG SYMPTOMS: No  INTESTINAL SYMPTOMS: No  URINARY SYMPTOMS: No  GYNECOLOGIC SYMPTOMS: No  BREAST SYMPTOMS: No  SKELETAL SYMPTOMS: No  BLOOD SYMPTOMS: No  NERVOUS SYSTEM SYMPTOMS: No  MENTAL HEALTH SYMPTOMS: No      PMH:            Endometriosis            Ovarian cyst  OB history             Pregnancy #1 11/1998 weeks gestation:40  vaginal delivery with no complication, male  "infant, BW: 7 lbs 9oz             Pregnancy #2: 2007 miscarriage             Pregnancy #3: 2009 weeks gestation:39  vaginal delivery with no complication, female infant,  BW: 6 lbs 8oz  Gyn History:         Menarche: 9 years old        No STDs, No history of ovarian, breast or endometrial cancer    Meds: Takes no medications typically    Allegies: NKDA  PSH:  Tympanostromy tubes  Endometrial scraping: - ,   (See HPI for recent GYN procedures)  FH:  Maternal Grandmother with diabetes.  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 Smokes 3 cigarettes/day for 22 years    PE:   Vitals:    17 0930   BP: (!) 149/95   BP Location: Right arm   Patient Position: Chair   Cuff Size: Adult Regular   Pulse: 91   Temp: 98.3  F (36.8  C)   TempSrc: Oral   SpO2: 99%   Weight: 62.5 kg (137 lb 12.8 oz)   Height: 1.6 m (5' 3\")      General Appearance: healthy and alert, no distress  Respiratory: normal diaphragmatic excursion  Skin: no lesions or rashes   Neurological: normal gait, no gross defects  Psychiatric: appropriate mood and affect           Genitourinary: deferred    Assesment: 36 year old  female , now about 4 weeks s/p RA-TLH-BS for cervical AIS, with no residual disease on pathology.    Plan  1. We discussed the role of ongoing surveillance for dysplasia of the lower genital tract due to her history of dysplasia/AIS.  Also discussed the relatively good prognosis of surgical treatment for Stage 0 disease of the cervix.  At this point recommend:   - Vaginal cytology & HPV testing q12 months   - Smoking cessation encouraged today; she has had some success with chantix in the past but doesn't feel ready/confident to quit right now.  Encouraged her to resume this discussion with her PCP as longitudinal care with support in " quitting will be most likely to aid in success.      Pt seen and discussed with Dr. Milton Szymanski MD (PGY-4)  2/22/2017 9:23 AM      I evaluated and examined this patient and directed all aspects of her care as outlined in mine and the resident's note above    Alex Rose Jr., M.D., M.S.  Professor, Gynecologic Oncology  Obstetrics, Gynecology and Women's Health   [employer]  Chief Medical Officer  [facility] and [facility]

## 2018-01-07 ENCOUNTER — HEALTH MAINTENANCE LETTER (OUTPATIENT)
Age: 38
End: 2018-01-07

## 2018-03-29 NOTE — PROGRESS NOTES
Surgery date: 1/27/17  Post op visit:  2/22/17    Spoke with pt states all is well  Pain   tolerable   Meds:   Ibuprofen Q 6hours, norco 1-2 tabs Q 6-7 hours   Well controlled:  Yes  Alternating these meds  Denies fever, chills, bowel/bladder issues, leg redness/swelling/tenderness, chest pain, SOB  Eating and drinking well, denies nausea/vomiting  Incision    Healthy: no signs of infection,     Redness or drainage:  Non    Shower:  yes  Staples:  Na  Walking:  yes  Questions:  none    Post op appt confirmed and patient will call if any questions or concerns

## 2020-03-10 ENCOUNTER — HEALTH MAINTENANCE LETTER (OUTPATIENT)
Age: 40
End: 2020-03-10

## 2020-12-27 ENCOUNTER — HEALTH MAINTENANCE LETTER (OUTPATIENT)
Age: 40
End: 2020-12-27

## 2021-04-24 ENCOUNTER — HEALTH MAINTENANCE LETTER (OUTPATIENT)
Age: 41
End: 2021-04-24

## 2021-10-09 ENCOUNTER — HEALTH MAINTENANCE LETTER (OUTPATIENT)
Age: 41
End: 2021-10-09

## 2022-05-16 ENCOUNTER — HEALTH MAINTENANCE LETTER (OUTPATIENT)
Age: 42
End: 2022-05-16

## 2022-09-11 ENCOUNTER — HEALTH MAINTENANCE LETTER (OUTPATIENT)
Age: 42
End: 2022-09-11

## 2023-06-03 ENCOUNTER — HEALTH MAINTENANCE LETTER (OUTPATIENT)
Age: 43
End: 2023-06-03

## 2023-07-29 ENCOUNTER — HEALTH MAINTENANCE LETTER (OUTPATIENT)
Age: 43
End: 2023-07-29

## 2024-07-03 ENCOUNTER — HOSPITAL ENCOUNTER (EMERGENCY)
Facility: CLINIC | Age: 44
Discharge: HOME | End: 2024-07-03
Attending: EMERGENCY MEDICINE | Admitting: EMERGENCY MEDICINE
Payer: COMMERCIAL

## 2024-07-03 ENCOUNTER — TELEPHONE (OUTPATIENT)
Dept: NURSING | Facility: CLINIC | Age: 44
End: 2024-07-03

## 2024-07-03 VITALS
SYSTOLIC BLOOD PRESSURE: 144 MMHG | HEART RATE: 88 BPM | HEIGHT: 63 IN | OXYGEN SATURATION: 97 % | BODY MASS INDEX: 24.63 KG/M2 | TEMPERATURE: 98 F | DIASTOLIC BLOOD PRESSURE: 89 MMHG | RESPIRATION RATE: 18 BRPM | WEIGHT: 139 LBS

## 2024-07-03 DIAGNOSIS — F10.920 ALCOHOLIC INTOXICATION WITHOUT COMPLICATION (H): ICD-10-CM

## 2024-07-03 LAB
ALBUMIN SERPL BCG-MCNC: 4.4 G/DL (ref 3.5–5.2)
ALCOHOL BREATH TEST: 0.18 (ref 0–0.01)
ALP SERPL-CCNC: 78 U/L (ref 40–150)
ALT SERPL W P-5'-P-CCNC: 89 U/L (ref 0–50)
AMPHETAMINES UR QL SCN: NORMAL
ANION GAP SERPL CALCULATED.3IONS-SCNC: 16 MMOL/L (ref 7–15)
AST SERPL W P-5'-P-CCNC: 100 U/L (ref 0–45)
BARBITURATES UR QL SCN: NORMAL
BASOPHILS # BLD AUTO: 0 10E3/UL (ref 0–0.2)
BASOPHILS NFR BLD AUTO: 1 %
BENZODIAZ UR QL SCN: NORMAL
BILIRUB SERPL-MCNC: 0.3 MG/DL
BUN SERPL-MCNC: 5.8 MG/DL (ref 6–20)
BZE UR QL SCN: NORMAL
CALCIUM SERPL-MCNC: 9.4 MG/DL (ref 8.6–10)
CANNABINOIDS UR QL SCN: NORMAL
CHLORIDE SERPL-SCNC: 103 MMOL/L (ref 98–107)
CREAT SERPL-MCNC: 0.55 MG/DL (ref 0.51–0.95)
DEPRECATED HCO3 PLAS-SCNC: 20 MMOL/L (ref 22–29)
EGFRCR SERPLBLD CKD-EPI 2021: >90 ML/MIN/1.73M2
EOSINOPHIL # BLD AUTO: 0.1 10E3/UL (ref 0–0.7)
EOSINOPHIL NFR BLD AUTO: 1 %
ERYTHROCYTE [DISTWIDTH] IN BLOOD BY AUTOMATED COUNT: 14.6 % (ref 10–15)
FENTANYL UR QL: NORMAL
GLUCOSE SERPL-MCNC: 103 MG/DL (ref 70–99)
HCG UR QL: NEGATIVE
HCT VFR BLD AUTO: 40.3 % (ref 35–47)
HGB BLD-MCNC: 13.9 G/DL (ref 11.7–15.7)
IMM GRANULOCYTES # BLD: 0 10E3/UL
IMM GRANULOCYTES NFR BLD: 0 %
LYMPHOCYTES # BLD AUTO: 1.6 10E3/UL (ref 0.8–5.3)
LYMPHOCYTES NFR BLD AUTO: 24 %
MAGNESIUM SERPL-MCNC: 2.7 MG/DL (ref 1.7–2.3)
MCH RBC QN AUTO: 30.4 PG (ref 26.5–33)
MCHC RBC AUTO-ENTMCNC: 34.5 G/DL (ref 31.5–36.5)
MCV RBC AUTO: 88 FL (ref 78–100)
MONOCYTES # BLD AUTO: 0.7 10E3/UL (ref 0–1.3)
MONOCYTES NFR BLD AUTO: 9 %
NEUTROPHILS # BLD AUTO: 4.4 10E3/UL (ref 1.6–8.3)
NEUTROPHILS NFR BLD AUTO: 65 %
NRBC # BLD AUTO: 0 10E3/UL
NRBC BLD AUTO-RTO: 0 /100
OPIATES UR QL SCN: NORMAL
PCP QUAL URINE (ROCHE): NORMAL
PLATELET # BLD AUTO: 216 10E3/UL (ref 150–450)
POTASSIUM SERPL-SCNC: 4 MMOL/L (ref 3.4–5.3)
PROT SERPL-MCNC: 7.3 G/DL (ref 6.4–8.3)
RBC # BLD AUTO: 4.57 10E6/UL (ref 3.8–5.2)
SODIUM SERPL-SCNC: 139 MMOL/L (ref 135–145)
WBC # BLD AUTO: 6.8 10E3/UL (ref 4–11)

## 2024-07-03 PROCEDURE — 80307 DRUG TEST PRSMV CHEM ANLYZR: CPT | Performed by: EMERGENCY MEDICINE

## 2024-07-03 PROCEDURE — 82075 ASSAY OF BREATH ETHANOL: CPT | Performed by: EMERGENCY MEDICINE

## 2024-07-03 PROCEDURE — 99283 EMERGENCY DEPT VISIT LOW MDM: CPT | Performed by: EMERGENCY MEDICINE

## 2024-07-03 PROCEDURE — 250N000013 HC RX MED GY IP 250 OP 250 PS 637: Performed by: EMERGENCY MEDICINE

## 2024-07-03 PROCEDURE — 81025 URINE PREGNANCY TEST: CPT | Performed by: EMERGENCY MEDICINE

## 2024-07-03 PROCEDURE — 36415 COLL VENOUS BLD VENIPUNCTURE: CPT | Performed by: EMERGENCY MEDICINE

## 2024-07-03 PROCEDURE — 85048 AUTOMATED LEUKOCYTE COUNT: CPT | Performed by: EMERGENCY MEDICINE

## 2024-07-03 PROCEDURE — 80053 COMPREHEN METABOLIC PANEL: CPT | Performed by: EMERGENCY MEDICINE

## 2024-07-03 PROCEDURE — 83735 ASSAY OF MAGNESIUM: CPT | Performed by: EMERGENCY MEDICINE

## 2024-07-03 PROCEDURE — 99284 EMERGENCY DEPT VISIT MOD MDM: CPT | Performed by: EMERGENCY MEDICINE

## 2024-07-03 RX ORDER — GABAPENTIN 300 MG/1
300 CAPSULE ORAL 2 TIMES DAILY
COMMUNITY

## 2024-07-03 RX ORDER — FOLIC ACID 1 MG/1
1 TABLET ORAL DAILY
Status: DISCONTINUED | OUTPATIENT
Start: 2024-07-03 | End: 2024-07-03 | Stop reason: HOSPADM

## 2024-07-03 RX ORDER — DIAZEPAM 5 MG
5-20 TABLET ORAL EVERY 30 MIN PRN
Status: DISCONTINUED | OUTPATIENT
Start: 2024-07-03 | End: 2024-07-03 | Stop reason: HOSPADM

## 2024-07-03 RX ORDER — FLUOXETINE 40 MG/1
40 CAPSULE ORAL DAILY
COMMUNITY
Start: 2024-03-26

## 2024-07-03 RX ORDER — OLMESARTAN MEDOXOMIL 20 MG/1
20 TABLET ORAL DAILY
COMMUNITY

## 2024-07-03 RX ORDER — MULTIPLE VITAMINS W/ MINERALS TAB 9MG-400MCG
1 TAB ORAL DAILY
Status: DISCONTINUED | OUTPATIENT
Start: 2024-07-03 | End: 2024-07-03 | Stop reason: HOSPADM

## 2024-07-03 RX ADMIN — THIAMINE HCL TAB 100 MG 100 MG: 100 TAB at 15:26

## 2024-07-03 RX ADMIN — Medication 1 TABLET: at 15:26

## 2024-07-03 RX ADMIN — FOLIC ACID 1 MG: 1 TABLET ORAL at 15:26

## 2024-07-03 ASSESSMENT — COLUMBIA-SUICIDE SEVERITY RATING SCALE - C-SSRS
1. IN THE PAST MONTH, HAVE YOU WISHED YOU WERE DEAD OR WISHED YOU COULD GO TO SLEEP AND NOT WAKE UP?: NO
6. HAVE YOU EVER DONE ANYTHING, STARTED TO DO ANYTHING, OR PREPARED TO DO ANYTHING TO END YOUR LIFE?: NO
2. HAVE YOU ACTUALLY HAD ANY THOUGHTS OF KILLING YOURSELF IN THE PAST MONTH?: NO

## 2024-07-03 ASSESSMENT — ACTIVITIES OF DAILY LIVING (ADL)
ADLS_ACUITY_SCORE: 35
ADLS_ACUITY_SCORE: 35

## 2024-07-03 NOTE — ED PROVIDER NOTES
ED Provider Note  [facility]      History     Chief Complaint   Patient presents with    Drug / Alcohol Assessment     Patient presents seeking detox from alcohol; last drink 10 minutes prior to arrival. Patient reports drinking 15-20 beers daily. Patient denies history of seizures. Patient denies street drugs. Patient is smoking a pack of cigarettes per day. Patient prefers patch for nicotine replacement.      LOTTIE Haile is a 44 year old female with past medical history of anxiety, alcoholism, hypertension, hyperlipidemia presents emergency department with friend for chief complaint of requesting alcohol detox.  She relapsed a couple of weeks ago.  Prior to that she had finished IOP at the end of May and was doing well.  She drinks approximately 15-20 beers daily.  Last drink was 10 minutes prior to arrival.  Denies any histories of seizures or DTs.  She does smoke tobacco daily, no other illicit drug use.  Denies any recent fevers or other illness.  Denies any trauma.  She has been eating and drinking.  Denies SI/HI, hallucinations.    Past Medical History  Past Medical History:   Diagnosis Date    Endometriosis     Ovarian cyst      Past Surgical History:   Procedure Laterality Date    COLPOSCOPY CERVIX, LOOP ELECTRODE BIOPSY, COMBINED N/A 1/12/2017    Procedure: COMBINED COLPOSCOPY CERVIX, LOOP ELECTRODE BIOPSY;  Surgeon: Alex Rose MD;  Location: [facility] OR    DAVINCI HYSTERECTOMY TOTAL, SALPINGECTOMY BILATERAL N/A 1/27/2017    Procedure: DAVINCI HYSTERECTOMY TOTAL, SALPINGECTOMY BILATERAL;  Surgeon: Alex Rose MD;  Location: [facility] OR    ENT SURGERY      Tympanostromy tubes    GYN SURGERY  2004, 2005    Endometrial scraping     FLUoxetine (PROZAC) 40 MG capsule  gabapentin (NEURONTIN) 300 MG capsule  olmesartan (BENICAR) 20 MG tablet      Allergies   Allergen Reactions    Hydrochlorothiazide Nausea and Vomiting    Other [No Clinical Screening - See Comments]      Patient  "stated she had \"a rash all over after recent anesthesia at [facility].\"     Family History  No family history on file.  Social History   Social History     Tobacco Use    Smoking status: Every Day     Current packs/day: 0.50     Types: Cigarettes    Smokeless tobacco: Never   Substance Use Topics    Alcohol use: Yes     Alcohol/week: 15.0 standard drinks of alcohol     Types: 15 Standard drinks or equivalent per week     Comment: NOT REGULARLY    Drug use: No      A medically appropriate review of systems was performed with pertinent positives and negatives noted in the HPI, and all other systems negative.    Physical Exam   BP: (!) 157/79  Pulse: 115  Temp: 98  F (36.7  C)  Resp: 18  Height: 160 cm (5' 3\")  Weight: 63 kg (139 lb)  SpO2: 98 %  Physical Exam  General: no acute distress.  Nondiaphoretic  HENT: Normocephalic and atraumatic. Trachea midline. Normal voice.  Eyes: EOMI, conjunctivae normal.    Cardiovascular:  Normal rate and regular rhythm.   No murmur heard.  Radial pulses 2+ bilaterally.  Pulmonary:  No respiratory distress. Normal breath sounds bilaterally.  Abdominal: no distension.  Abdomen is soft. There is no mass. There is no abdominal tenderness.  Musculoskeletal:    Moving all extremities spontaneously.  No evidence of trauma  Skin: Warm, dry, and well perfused. Good turgor.  Neurological:  No focal deficit present.    Psychiatric:   The patient is awake, alert.  Appropriate mood and affect.    ED Course, Procedures, & Data      Procedures                Results for orders placed or performed during the hospital encounter of 07/03/24   Alcohol breath test POCT     Status: Abnormal   Result Value Ref Range    Alcohol Breath Test 0.184 (A) 0.00 - 0.01   Urine Drug Screen     Status: None (In process)    Narrative    The following orders were created for panel order Urine Drug Screen.  Procedure                               Abnormality         Status                     ---------         "                       -----------         ------                     Urine Drug Screen Panel[[id number]]                          In process                   Please view results for these tests on the individual orders.   CBC with platelets differential     Status: None ()    Narrative    The following orders were created for panel order CBC with platelets differential.  Procedure                               Abnormality         Status                     ---------                               -----------         ------                     CBC with platelets and d...[[id number]]                                                   Please view results for these tests on the individual orders.     Medications   diazepam (VALIUM) tablet 5-20 mg (has no administration in time range)   thiamine (B-1) tablet 100 mg (has no administration in time range)   folic acid (FOLVITE) tablet 1 mg (has no administration in time range)   multivitamin w/minerals (THERA-VIT-M) tablet 1 tablet (has no administration in time range)     Labs Ordered and Resulted from Time of ED Arrival to Time of ED Departure   ALCOHOL BREATH TEST POCT - Abnormal       Result Value    Alcohol Breath Test 0.184 (*)    HCG QUALITATIVE URINE   COMPREHENSIVE METABOLIC PANEL   CBC WITH PLATELETS AND DIFFERENTIAL   URINE DRUG SCREEN PANEL     No orders to display          Critical care was not performed.     Medical Decision Making  The patient's presentation was of moderate complexity (an acute illness with systemic symptoms).  The patient's evaluation involved:  review of external note(s) from 3+ sources (see separate area of note for details)  review of 3+ test result(s) ordered prior to this encounter (see separate area of note for details)  strong consideration of a test (see separate area of note for details) that was ultimately deferred  ordering and/or review of 3+ test(s) in this encounter (see separate area of note for details)    The patient's  management necessitated high risk (a decision regarding hospitalization).      Assessment & Plan    Patient arrives requesting detox for alcohol.  On exam, patient is afebrile and in no acute distress.  She is somewhat tachycardic with HR 115bpm.  She was able to eat and drink without difficulty.  We provided her vitamins.  [facility] protocol initiated.  After blood work was drawn, patient states that she did not want to stay in alcohol detox here anymore, as she saw somebody else in the ED that she knew and did not want to be near him.  She states that she will pursue alcohol detox at a different location, and she has the resources and phone numbers for this.  She does not want to stay for her results.  She was with a friend, who will be driving her and taking care of her.  They are discharged with return precautions.    I have reviewed the nursing notes. I have reviewed the findings, diagnosis, plan and need for follow up with the patient.    New Prescriptions    No medications on file       Final diagnoses:   None         [facility] EMERGENCY DEPARTMENT  7/3/2024     Trinity Cabrera DO  07/03/24 1746

## 2024-07-03 NOTE — ED NOTES
Pt asked to be discharged. She said she doesn't feel comfortable with the other patients waiting in the ED to go to detox and doesn't want to be in detox with them. Pt says she is going to arrange for treatment at [place]. Pt's friend is here and agreed to take her home. Dr. Cabrera updated.

## 2024-07-03 NOTE — TELEPHONE ENCOUNTER
[facility]    Reason for call: Lab Result Notification     Lab Result (including Rx patient on, if applicable).  If culture, copy of lab report at bottom.  Lab Result:   Component      Latest Ref Rng 7/3/2024  2:58 PM   Carbon Dioxide (CO2)      22 - 29 mmol/L 20 (L)    Anion Gap      7 - 15 mmol/L 16 (H)    Urea Nitrogen      6.0 - 20.0 mg/dL 5.8 (L)       Component      Latest Ref Rng 7/3/2024  2:58 PM   AST      0 - 45 U/L 100 (H)    ALT      0 - 50 U/L 89 (H)       Legend:  (H) High  Component      Latest Ref Rng 7/3/2024  2:58 PM   Glucose      70 - 99 mg/dL 103 (H)      (H) High    Creatinine Level (mg/dl)   Creatinine   Date Value Ref Range Status   07/03/2024 0.55 0.51 - 0.95 mg/dL Final    Creatinine clearance (ml/min), if applicable    Serum creatinine: 0.55 mg/dL 07/03/24 1458  Estimated creatinine clearance: 114.4 mL/min     Patient's current Symptoms:   Pt states she feel fine and will convey these result to her PCP ASAP. She also has an active MyChart she can view results.    RN Recommendations/Instructions per [place] ED lab result protocol:   [facility] ED lab result protocol utilized: Misc    Patient/care giver notified to contact your PCP clinic or return to the Emergency department if your:    Symptoms worsen or other concerning symptoms.    Deyanira Moser, RN   Spoke to pt who informed that she went back to [facility] 2 hours after taking the Flecainide and metoprolol and just wanted to let Dr Cruz know about it. Informed pt that Dr Cruz will be notified.

## 2024-07-03 NOTE — ED TRIAGE NOTES
Patient presents seeking detox from alcohol; last drink 10 minutes prior to arrival. Patient reports drinking 15-20 beers daily. Patient denies history of seizures. Patient denies street drugs. Patient is smoking a pack of cigarettes per day. Patient prefers patch for nicotine replacement.      Triage Assessment (Adult)       Row Name 07/03/24 1409          Triage Assessment    Airway WDL WDL        Respiratory WDL    Respiratory WDL WDL        Skin Circulation/Temperature WDL    Skin Circulation/Temperature WDL WDL        Cardiac WDL    Cardiac WDL WDL        Peripheral/Neurovascular WDL    Peripheral Neurovascular WDL WDL        Cognitive/Neuro/Behavioral WDL    Cognitive/Neuro/Behavioral WDL WDL

## 2024-07-13 ENCOUNTER — HEALTH MAINTENANCE LETTER (OUTPATIENT)
Age: 44
End: 2024-07-13

## 2025-07-19 ENCOUNTER — HEALTH MAINTENANCE LETTER (OUTPATIENT)
Age: 45
End: 2025-07-19

## 2025-08-09 ENCOUNTER — HEALTH MAINTENANCE LETTER (OUTPATIENT)
Age: 45
End: 2025-08-09

## (undated) DEVICE — TUBING SMOKE EVAC 2.2CM WAND SEA3725

## (undated) DEVICE — Device

## (undated) DEVICE — TUBING SMOKE EVAC 2.2CMX3M SEA3715

## (undated) DEVICE — SWAB PROCTO 16" 2/PK 32-046

## (undated) DEVICE — DRAPE LEGGINGS CLEAR 8430

## (undated) DEVICE — SU SILK 2-0 SH 30" K833H

## (undated) DEVICE — TUBING SMOKE EVAC .635CMX3M SEA3705

## (undated) DEVICE — PREP SCRUB CARE CHLOROXYLENOL (PCMX) 4OZ 29902-004

## (undated) DEVICE — GLOVE PROTEXIS POWDER FREE 7.5 ORTHOPEDIC 2D73ET75

## (undated) DEVICE — LINEN TOWEL PACK X5 5464

## (undated) DEVICE — TUBING SMOKE EVAC 1CMX3M SEA3710

## (undated) DEVICE — ESU GROUND PAD ADULT W/CORD E7507

## (undated) DEVICE — SOL WATER IRRIG 500ML BOTTLE 2F7113

## (undated) RX ORDER — PROPOFOL 10 MG/ML
INJECTION, EMULSION INTRAVENOUS
Status: DISPENSED
Start: 2017-01-12

## (undated) RX ORDER — FENTANYL CITRATE 50 UG/ML
INJECTION, SOLUTION INTRAMUSCULAR; INTRAVENOUS
Status: DISPENSED
Start: 2017-01-12

## (undated) RX ORDER — ACETAMINOPHEN 500 MG
TABLET ORAL
Status: DISPENSED
Start: 2017-01-12

## (undated) RX ORDER — ACETAMINOPHEN 325 MG/1
TABLET ORAL
Status: DISPENSED
Start: 2017-01-12

## (undated) RX ORDER — KETAMINE HYDROCHLORIDE 10 MG/ML
INJECTION, SOLUTION INTRAMUSCULAR; INTRAVENOUS
Status: DISPENSED
Start: 2017-01-12